# Patient Record
Sex: FEMALE | Race: WHITE | NOT HISPANIC OR LATINO | Employment: FULL TIME | URBAN - METROPOLITAN AREA
[De-identification: names, ages, dates, MRNs, and addresses within clinical notes are randomized per-mention and may not be internally consistent; named-entity substitution may affect disease eponyms.]

---

## 2017-01-11 ENCOUNTER — OFFICE VISIT - HEALTHEAST (OUTPATIENT)
Dept: FAMILY MEDICINE | Facility: CLINIC | Age: 37
End: 2017-01-11

## 2017-01-11 DIAGNOSIS — F41.1 GENERALIZED ANXIETY DISORDER: ICD-10-CM

## 2017-01-11 DIAGNOSIS — G47.00 INSOMNIA, UNSPECIFIED: ICD-10-CM

## 2017-01-11 DIAGNOSIS — G43.909 MIGRAINE: ICD-10-CM

## 2017-01-18 ENCOUNTER — COMMUNICATION - HEALTHEAST (OUTPATIENT)
Dept: FAMILY MEDICINE | Facility: CLINIC | Age: 37
End: 2017-01-18

## 2017-01-19 ENCOUNTER — COMMUNICATION - HEALTHEAST (OUTPATIENT)
Dept: FAMILY MEDICINE | Facility: CLINIC | Age: 37
End: 2017-01-19

## 2017-01-20 ENCOUNTER — COMMUNICATION - HEALTHEAST (OUTPATIENT)
Dept: FAMILY MEDICINE | Facility: CLINIC | Age: 37
End: 2017-01-20

## 2017-01-25 ENCOUNTER — COMMUNICATION - HEALTHEAST (OUTPATIENT)
Dept: FAMILY MEDICINE | Facility: CLINIC | Age: 37
End: 2017-01-25

## 2017-01-25 DIAGNOSIS — G43.909 MIGRAINE: ICD-10-CM

## 2017-02-07 ENCOUNTER — COMMUNICATION - HEALTHEAST (OUTPATIENT)
Dept: FAMILY MEDICINE | Facility: CLINIC | Age: 37
End: 2017-02-07

## 2017-02-07 DIAGNOSIS — G43.909 MIGRAINE: ICD-10-CM

## 2017-02-16 ENCOUNTER — COMMUNICATION - HEALTHEAST (OUTPATIENT)
Dept: FAMILY MEDICINE | Facility: CLINIC | Age: 37
End: 2017-02-16

## 2017-02-16 DIAGNOSIS — F41.1 GENERALIZED ANXIETY DISORDER: ICD-10-CM

## 2017-03-06 ENCOUNTER — COMMUNICATION - HEALTHEAST (OUTPATIENT)
Dept: FAMILY MEDICINE | Facility: CLINIC | Age: 37
End: 2017-03-06

## 2017-03-06 DIAGNOSIS — G43.909 MIGRAINE: ICD-10-CM

## 2017-03-15 ENCOUNTER — COMMUNICATION - HEALTHEAST (OUTPATIENT)
Dept: FAMILY MEDICINE | Facility: CLINIC | Age: 37
End: 2017-03-15

## 2017-03-15 DIAGNOSIS — G43.909 MIGRAINE: ICD-10-CM

## 2017-03-18 ENCOUNTER — COMMUNICATION - HEALTHEAST (OUTPATIENT)
Dept: FAMILY MEDICINE | Facility: CLINIC | Age: 37
End: 2017-03-18

## 2017-03-20 ENCOUNTER — COMMUNICATION - HEALTHEAST (OUTPATIENT)
Dept: FAMILY MEDICINE | Facility: CLINIC | Age: 37
End: 2017-03-20

## 2017-03-21 ENCOUNTER — OFFICE VISIT - HEALTHEAST (OUTPATIENT)
Dept: FAMILY MEDICINE | Facility: CLINIC | Age: 37
End: 2017-03-21

## 2017-03-21 DIAGNOSIS — F19.939: ICD-10-CM

## 2017-03-21 DIAGNOSIS — F41.1 GENERALIZED ANXIETY DISORDER: ICD-10-CM

## 2017-03-21 DIAGNOSIS — F11.10 OPIOID ABUSE (H): ICD-10-CM

## 2017-03-21 DIAGNOSIS — G43.909 MIGRAINE: ICD-10-CM

## 2017-03-21 ASSESSMENT — MIFFLIN-ST. JEOR: SCORE: 1329.26

## 2017-03-23 ENCOUNTER — COMMUNICATION - HEALTHEAST (OUTPATIENT)
Dept: SCHEDULING | Facility: CLINIC | Age: 37
End: 2017-03-23

## 2017-04-05 ENCOUNTER — COMMUNICATION - HEALTHEAST (OUTPATIENT)
Dept: FAMILY MEDICINE | Facility: CLINIC | Age: 37
End: 2017-04-05

## 2017-04-11 ENCOUNTER — COMMUNICATION - HEALTHEAST (OUTPATIENT)
Dept: FAMILY MEDICINE | Facility: CLINIC | Age: 37
End: 2017-04-11

## 2017-04-11 DIAGNOSIS — G47.00 INSOMNIA, UNSPECIFIED: ICD-10-CM

## 2017-04-12 ENCOUNTER — COMMUNICATION - HEALTHEAST (OUTPATIENT)
Dept: FAMILY MEDICINE | Facility: CLINIC | Age: 37
End: 2017-04-12

## 2017-04-12 DIAGNOSIS — G47.00 INSOMNIA, UNSPECIFIED: ICD-10-CM

## 2017-04-17 ENCOUNTER — COMMUNICATION - HEALTHEAST (OUTPATIENT)
Dept: FAMILY MEDICINE | Facility: CLINIC | Age: 37
End: 2017-04-17

## 2017-04-25 ENCOUNTER — OFFICE VISIT - HEALTHEAST (OUTPATIENT)
Dept: FAMILY MEDICINE | Facility: CLINIC | Age: 37
End: 2017-04-25

## 2017-04-25 DIAGNOSIS — F32.9 REACTIVE DEPRESSION: ICD-10-CM

## 2017-04-25 DIAGNOSIS — G43.909 MIGRAINE: ICD-10-CM

## 2017-04-25 DIAGNOSIS — F41.1 GENERALIZED ANXIETY DISORDER: ICD-10-CM

## 2017-04-25 DIAGNOSIS — F11.10 OPIOID ABUSE (H): ICD-10-CM

## 2017-04-30 ENCOUNTER — COMMUNICATION - HEALTHEAST (OUTPATIENT)
Dept: FAMILY MEDICINE | Facility: CLINIC | Age: 37
End: 2017-04-30

## 2017-05-01 ENCOUNTER — COMMUNICATION - HEALTHEAST (OUTPATIENT)
Dept: FAMILY MEDICINE | Facility: CLINIC | Age: 37
End: 2017-05-01

## 2017-05-01 DIAGNOSIS — G47.00 INSOMNIA, UNSPECIFIED: ICD-10-CM

## 2017-05-03 ENCOUNTER — COMMUNICATION - HEALTHEAST (OUTPATIENT)
Dept: FAMILY MEDICINE | Facility: CLINIC | Age: 37
End: 2017-05-03

## 2017-05-10 ENCOUNTER — OFFICE VISIT - HEALTHEAST (OUTPATIENT)
Dept: FAMILY MEDICINE | Facility: CLINIC | Age: 37
End: 2017-05-10

## 2017-05-10 DIAGNOSIS — F11.10 OPIOID ABUSE (H): ICD-10-CM

## 2017-05-10 DIAGNOSIS — F32.9 REACTIVE DEPRESSION: ICD-10-CM

## 2017-05-10 DIAGNOSIS — F41.1 GENERALIZED ANXIETY DISORDER: ICD-10-CM

## 2017-05-11 ENCOUNTER — COMMUNICATION - HEALTHEAST (OUTPATIENT)
Dept: FAMILY MEDICINE | Facility: CLINIC | Age: 37
End: 2017-05-11

## 2017-05-12 ENCOUNTER — COMMUNICATION - HEALTHEAST (OUTPATIENT)
Dept: FAMILY MEDICINE | Facility: CLINIC | Age: 37
End: 2017-05-12

## 2017-05-15 ENCOUNTER — COMMUNICATION - HEALTHEAST (OUTPATIENT)
Dept: FAMILY MEDICINE | Facility: CLINIC | Age: 37
End: 2017-05-15

## 2017-05-15 ENCOUNTER — AMBULATORY - HEALTHEAST (OUTPATIENT)
Dept: FAMILY MEDICINE | Facility: CLINIC | Age: 37
End: 2017-05-15

## 2017-05-15 DIAGNOSIS — G47.00 INSOMNIA, UNSPECIFIED: ICD-10-CM

## 2017-05-16 ENCOUNTER — COMMUNICATION - HEALTHEAST (OUTPATIENT)
Dept: FAMILY MEDICINE | Facility: CLINIC | Age: 37
End: 2017-05-16

## 2017-05-17 ENCOUNTER — COMMUNICATION - HEALTHEAST (OUTPATIENT)
Dept: FAMILY MEDICINE | Facility: CLINIC | Age: 37
End: 2017-05-17

## 2017-05-26 ENCOUNTER — COMMUNICATION - HEALTHEAST (OUTPATIENT)
Dept: FAMILY MEDICINE | Facility: CLINIC | Age: 37
End: 2017-05-26

## 2017-05-29 ENCOUNTER — COMMUNICATION - HEALTHEAST (OUTPATIENT)
Dept: FAMILY MEDICINE | Facility: CLINIC | Age: 37
End: 2017-05-29

## 2017-05-30 ENCOUNTER — COMMUNICATION - HEALTHEAST (OUTPATIENT)
Dept: FAMILY MEDICINE | Facility: CLINIC | Age: 37
End: 2017-05-30

## 2017-06-01 ENCOUNTER — OFFICE VISIT - HEALTHEAST (OUTPATIENT)
Dept: BEHAVIORAL HEALTH | Facility: CLINIC | Age: 37
End: 2017-06-01

## 2017-06-01 DIAGNOSIS — F41.1 GENERALIZED ANXIETY DISORDER: ICD-10-CM

## 2017-06-01 DIAGNOSIS — F11.10 OPIOID ABUSE (H): ICD-10-CM

## 2017-06-01 ASSESSMENT — MIFFLIN-ST. JEOR: SCORE: 1351.94

## 2017-06-12 ENCOUNTER — OFFICE VISIT - HEALTHEAST (OUTPATIENT)
Dept: FAMILY MEDICINE | Facility: CLINIC | Age: 37
End: 2017-06-12

## 2017-06-12 DIAGNOSIS — F41.1 GENERALIZED ANXIETY DISORDER: ICD-10-CM

## 2017-06-12 DIAGNOSIS — F11.10 OPIOID ABUSE (H): ICD-10-CM

## 2017-06-14 ENCOUNTER — COMMUNICATION - HEALTHEAST (OUTPATIENT)
Dept: FAMILY MEDICINE | Facility: CLINIC | Age: 37
End: 2017-06-14

## 2017-06-23 ENCOUNTER — COMMUNICATION - HEALTHEAST (OUTPATIENT)
Dept: FAMILY MEDICINE | Facility: CLINIC | Age: 37
End: 2017-06-23

## 2017-07-22 ENCOUNTER — COMMUNICATION - HEALTHEAST (OUTPATIENT)
Dept: SCHEDULING | Facility: CLINIC | Age: 37
End: 2017-07-22

## 2017-07-25 ENCOUNTER — COMMUNICATION - HEALTHEAST (OUTPATIENT)
Dept: FAMILY MEDICINE | Facility: CLINIC | Age: 37
End: 2017-07-25

## 2017-08-29 ENCOUNTER — AMBULATORY - HEALTHEAST (OUTPATIENT)
Dept: FAMILY MEDICINE | Facility: CLINIC | Age: 37
End: 2017-08-29

## 2017-10-30 ENCOUNTER — COMMUNICATION - HEALTHEAST (OUTPATIENT)
Dept: FAMILY MEDICINE | Facility: CLINIC | Age: 37
End: 2017-10-30

## 2019-06-04 ENCOUNTER — OFFICE VISIT - HEALTHEAST (OUTPATIENT)
Dept: FAMILY MEDICINE | Facility: CLINIC | Age: 39
End: 2019-06-04

## 2019-06-04 ENCOUNTER — COMMUNICATION - HEALTHEAST (OUTPATIENT)
Dept: SCHEDULING | Facility: CLINIC | Age: 39
End: 2019-06-04

## 2019-06-04 DIAGNOSIS — R10.13 ABDOMINAL PAIN, EPIGASTRIC: ICD-10-CM

## 2019-06-04 LAB
ERYTHROCYTE [DISTWIDTH] IN BLOOD BY AUTOMATED COUNT: 11 % (ref 11–14.5)
HCT VFR BLD AUTO: 37 % (ref 35–47)
HGB BLD-MCNC: 12.4 G/DL (ref 12–16)
MCH RBC QN AUTO: 31.7 PG (ref 27–34)
MCHC RBC AUTO-ENTMCNC: 33.7 G/DL (ref 32–36)
MCV RBC AUTO: 94 FL (ref 80–100)
PLATELET # BLD AUTO: 400 THOU/UL (ref 140–440)
PMV BLD AUTO: 7 FL (ref 7–10)
RBC # BLD AUTO: 3.93 MILL/UL (ref 3.8–5.4)
WBC: 6.8 THOU/UL (ref 4–11)

## 2019-06-04 RX ORDER — OMEPRAZOLE 40 MG/1
40 CAPSULE, DELAYED RELEASE ORAL DAILY
Qty: 90 CAPSULE | Refills: 3 | Status: SHIPPED | OUTPATIENT
Start: 2019-06-04

## 2019-06-04 RX ORDER — CLONAZEPAM 1 MG/1
TABLET ORAL
Refills: 2 | Status: SHIPPED | COMMUNITY
Start: 2019-05-29

## 2019-06-04 RX ORDER — LAMOTRIGINE 100 MG/1
50 TABLET ORAL
Refills: 1 | Status: SHIPPED | COMMUNITY
Start: 2019-04-20

## 2019-06-04 RX ORDER — CLONIDINE HYDROCHLORIDE 0.1 MG/1
TABLET ORAL
Refills: 1 | Status: SHIPPED | COMMUNITY
Start: 2019-04-02

## 2019-06-05 LAB
ALBUMIN SERPL-MCNC: 4.2 G/DL (ref 3.5–5)
ALP SERPL-CCNC: 14 U/L (ref 45–120)
ALT SERPL W P-5'-P-CCNC: 18 U/L (ref 0–45)
AST SERPL W P-5'-P-CCNC: 12 U/L (ref 0–40)
BILIRUB DIRECT SERPL-MCNC: 0.1 MG/DL
BILIRUB SERPL-MCNC: 0.3 MG/DL (ref 0–1)
LIPASE SERPL-CCNC: 37 U/L (ref 0–52)
PROT SERPL-MCNC: 6.8 G/DL (ref 6–8)

## 2019-06-20 ENCOUNTER — COMMUNICATION - HEALTHEAST (OUTPATIENT)
Dept: FAMILY MEDICINE | Facility: CLINIC | Age: 39
End: 2019-06-20

## 2019-11-07 ENCOUNTER — OFFICE VISIT - HEALTHEAST (OUTPATIENT)
Dept: FAMILY MEDICINE | Facility: CLINIC | Age: 39
End: 2019-11-07

## 2019-11-07 DIAGNOSIS — R11.0 NAUSEA: ICD-10-CM

## 2019-11-07 DIAGNOSIS — G47.00 INSOMNIA, UNSPECIFIED TYPE: ICD-10-CM

## 2019-11-07 DIAGNOSIS — F41.1 GENERALIZED ANXIETY DISORDER: ICD-10-CM

## 2019-11-07 DIAGNOSIS — R22.31 LUMP OF AXILLA, RIGHT: ICD-10-CM

## 2019-11-07 RX ORDER — ONDANSETRON 4 MG/1
4 TABLET, FILM COATED ORAL DAILY PRN
Qty: 30 TABLET | Refills: 1 | Status: SHIPPED | OUTPATIENT
Start: 2019-11-07

## 2019-11-07 ASSESSMENT — ANXIETY QUESTIONNAIRES
4. TROUBLE RELAXING: NEARLY EVERY DAY
6. BECOMING EASILY ANNOYED OR IRRITABLE: NEARLY EVERY DAY
GAD7 TOTAL SCORE: 21
3. WORRYING TOO MUCH ABOUT DIFFERENT THINGS: NEARLY EVERY DAY
7. FEELING AFRAID AS IF SOMETHING AWFUL MIGHT HAPPEN: NEARLY EVERY DAY
1. FEELING NERVOUS, ANXIOUS, OR ON EDGE: NEARLY EVERY DAY
2. NOT BEING ABLE TO STOP OR CONTROL WORRYING: NEARLY EVERY DAY
IF YOU CHECKED OFF ANY PROBLEMS ON THIS QUESTIONNAIRE, HOW DIFFICULT HAVE THESE PROBLEMS MADE IT FOR YOU TO DO YOUR WORK, TAKE CARE OF THINGS AT HOME, OR GET ALONG WITH OTHER PEOPLE: EXTREMELY DIFFICULT
5. BEING SO RESTLESS THAT IT IS HARD TO SIT STILL: NEARLY EVERY DAY

## 2019-11-07 ASSESSMENT — MIFFLIN-ST. JEOR: SCORE: 1188.65

## 2019-11-14 ENCOUNTER — HOSPITAL ENCOUNTER (OUTPATIENT)
Dept: MAMMOGRAPHY | Facility: CLINIC | Age: 39
Discharge: HOME | End: 2019-11-14
Attending: FAMILY MEDICINE

## 2019-11-14 DIAGNOSIS — R22.31 LUMP OF AXILLA, RIGHT: ICD-10-CM

## 2019-12-12 ENCOUNTER — COMMUNICATION - HEALTHEAST (OUTPATIENT)
Dept: FAMILY MEDICINE | Facility: CLINIC | Age: 39
End: 2019-12-12

## 2020-04-23 ENCOUNTER — COMMUNICATION - HEALTHEAST (OUTPATIENT)
Dept: FAMILY MEDICINE | Facility: CLINIC | Age: 40
End: 2020-04-23

## 2020-04-28 ENCOUNTER — COMMUNICATION - HEALTHEAST (OUTPATIENT)
Dept: FAMILY MEDICINE | Facility: CLINIC | Age: 40
End: 2020-04-28

## 2020-09-03 ENCOUNTER — COMMUNICATION - HEALTHEAST (OUTPATIENT)
Dept: FAMILY MEDICINE | Facility: CLINIC | Age: 40
End: 2020-09-03

## 2021-04-30 ENCOUNTER — AMBULATORY - HEALTHEAST (OUTPATIENT)
Dept: LAB | Facility: CLINIC | Age: 41
End: 2021-04-30

## 2021-04-30 ENCOUNTER — COMMUNICATION - HEALTHEAST (OUTPATIENT)
Dept: ADMINISTRATIVE | Facility: CLINIC | Age: 41
End: 2021-04-30

## 2021-04-30 DIAGNOSIS — Z79.899 CONTROLLED SUBSTANCE AGREEMENT SIGNED: ICD-10-CM

## 2021-04-30 LAB
AMPHETAMINE-CLINIC: ABNORMAL
BARBITURATES-CLINIC: ABNORMAL
BENZODIAZEPINES-CLINIC: ABNORMAL
BUPRENORPHINE-CLINIC: ABNORMAL
COCAINE-CLINIC: ABNORMAL
ECSTASY-CLINIC: ABNORMAL
MARIJUANA-CLINIC: ABNORMAL
METHADONE METABOLITE-CLINIC: ABNORMAL
METHAMPHETAMINE-CLINIC: ABNORMAL
OPIATES-CLINIC: ABNORMAL
OXIDANTS: NORMAL
OXYCODONE-CLINIC: ABNORMAL
PCP 25-CLINIC: ABNORMAL
PH-CLINIC: 4 (ref 5–8)
SP GR UR STRIP: 1.01 (ref 1–1.03)

## 2021-06-05 ENCOUNTER — RECORDS - HEALTHEAST (OUTPATIENT)
Dept: FAMILY MEDICINE | Facility: CLINIC | Age: 41
End: 2021-06-05

## 2021-06-05 DIAGNOSIS — R51.9 HEADACHE: ICD-10-CM

## 2021-06-24 ENCOUNTER — COMMUNICATION - HEALTHEAST (OUTPATIENT)
Dept: SCHEDULING | Facility: CLINIC | Age: 41
End: 2021-06-24

## 2021-06-26 ENCOUNTER — HEALTH MAINTENANCE LETTER (OUTPATIENT)
Age: 41
End: 2021-06-26

## 2021-07-15 VITALS
WEIGHT: 148 LBS | BODY MASS INDEX: 26.03 KG/M2 | BODY MASS INDEX: 25.52 KG/M2 | WEIGHT: 152 LBS | BODY MASS INDEX: 24.66 KG/M2 | WEIGHT: 154 LBS | WEIGHT: 151 LBS | HEIGHT: 65 IN | BODY MASS INDEX: 25.69 KG/M2

## 2021-07-15 VITALS — WEIGHT: 153 LBS | BODY MASS INDEX: 25.49 KG/M2 | BODY MASS INDEX: 26.03 KG/M2 | WEIGHT: 154 LBS | HEIGHT: 65 IN

## 2021-07-15 VITALS
HEART RATE: 78 BPM | BODY MASS INDEX: 19.49 KG/M2 | DIASTOLIC BLOOD PRESSURE: 100 MMHG | SYSTOLIC BLOOD PRESSURE: 150 MMHG | WEIGHT: 117 LBS | HEIGHT: 65 IN

## 2021-07-15 VITALS — BODY MASS INDEX: 21.29 KG/M2 | WEIGHT: 126 LBS
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
## 2021-07-15 NOTE — PROGRESS NOTES
Assessment:     1. Reactive depression     2. Opioid abuse  Ambulatory referral to Psychiatry   3. Generalized anxiety disorder  Ambulatory referral to Psychiatry       Plan:     Chantal is a very pleasant 37 yo female in today for a f/u regarding Wellbutrin, depression and anxiety. I recommended continuing the Wellbutrin but monitoring for ongoing sleep disturbance. We had a long discussion regarding her Ativan. I spoke last week to her therapist who stated that she needs to wean off the Ativan slowly and ultimately, not take this medication due to its addicting nature and that it does not help Chantal to learn to cope with her feelings in more constructive ways. I discussed with the patient that having the time and energy to devote to the hard work and recovery process is essential to her long term success and strongly urged her to be sure to limit her overtime as much as possible and offered to write a note if she needs a doctors order for that. I would write the note based on her diagnosis of EDIE. I tried to provide a timeline to gradually wean off the Ativan over the next 6 months, however, even her mom was very uncomfortable with that recommendation. I ultimately suggested an alternative which is that she would establish with a psychiatrist who would take over managing her medication and that after she establishes with someone within the next 3 months, then I would no longer prescribe that drug. She was OK with that plan and I placed a referral. F/U in 1 month. 30 minutes spend with patient today over 50% counseling about the above discussion.    Subjective:       36 y.o. female presents for evaluation of generalized anxiety disorder and opioid abuse. The patient continues at [facility] working with a therapist and remains sober from opioids. She was last seen about 3 weeks ago and states that the addition of Wellbutrin did seem to help with her depression and she is now having some days where she  "states she feels \"happy.\" She has had some insomnia from the new medication but last night slept well and hopes that is getting better as she would like to continue on the medication. She is still very stressed and anxious. She is going to be starting couples counseling soon. She also states that she feels exhausted trying to balance the demands of work, parenting, and her devoted time and work on her sobriety. This is the busiest time of the year for her work and she has been asked recently to work overtime. She also is worried about finances and has found out that her health insurance will only allow 10 more sessions with therapy. Her mom accompanies her again today and continues to live with them. The patient states that she is currently taking Ativan 3 times daily for anxiety. She really does not feel that she has coping skills to deal with her anxiety any other way. The thought of not having the medication \"terrifies her.\" Her mom also is worried about taking her off the Ativan right now as she feels it is needed to manage her high levels of anxiety.     The following portions of the patient's history were reviewed and updated as appropriate: allergies, current medications, past family history, past medical history, past social history, past surgical history and problem list.    Review of Systems  Pertinent items are noted in HPI.     History   Smoking Status     Former Smoker   Smokeless Tobacco     Never Used       Objective:      /72 (Patient Site: Left Arm, Patient Position: Sitting, Cuff Size: Adult Regular)  Pulse 72  Wt 151 lb (68.5 kg)  Breastfeeding? No  BMI 25.52 kg/m2  General appearance: alert, appears stated age and cooperative       This note has been dictated using voice recognition software. Any grammatical or context distortions are unintentional and inherent to the software  "

## 2021-07-15 NOTE — TELEPHONE ENCOUNTER
Reason for Call: Request for an order or referral: Pt is looking to get this done today!    Order or referral being requested: Pt is requesting a drug urine test to be done today if possible.     Date needed: as soon as possible    Has the patient been seen by the PCP for this problem? YES and Not Applicable    Additional comments: Pt stated that Dr. Mata would do this. Pt is going through a divorce and ex is stating she is on drugs and she needs this test to disprove the comments.     Phone number Patient can be reached at:  Home number on file [phone] (home)    Best Time:      Can we leave a detailed message on this number?  Yes    Call taken on 4/30/2021 at 2:05 PM by Shirin Hong

## 2021-07-15 NOTE — PROGRESS NOTES
Assessment:     1. Nausea  ondansetron (ZOFRAN) 4 MG tablet   2. Lump of axilla, right  US Breast Right Limited 1-3 Quadrants    Mammo Diagnostic Bilateral    CANCELED: Mammo Diagnostic Right   3. Generalized anxiety disorder     4. Insomnia, unspecified type         Plan:     Chantal is a 39-year-old female presenting today regarding nausea, lack of appetite and weight loss.  The patient has lost over 30 pounds over the past year.  I do think this is related to her severe stress and distress related to the divorce compounded by her underlying anxiety disorder.  I explained that since she has a primary psychiatrist, I do not feel comfortable addressing the symptoms outside of giving her some recommendations as well as prescribing some Zofran to take as needed for nausea.  However, we did discuss that medications such as Paxil and combination with Remeron at night could potentially be quite beneficial at this time to help her cope.  The patient has a completely benign physical exam and abdominal exam today and does not have any red flag symptoms to suggest an underlying GI disorder as a reason for her weight loss.  However, we did discuss that today and to monitor for any concerning signs or symptoms.  I encouraged her to call her psychiatrist for an appointment or consideration of an adjustment in her medication.  Patient's exam is relatively benign but I do think getting a diagnostic mammogram of her right breast and ultrasound along with a screening mammogram on her left would be appropriate and an order was placed today.    Subjective:       39 y.o. female presents today accompanied by her mom with concerns regarding significant nausea, occasional vomiting, insomnia and severe anxiety.  The patient has been in a very difficult process of divorce over the past few months and reports that the stress has continued to increase significantly especially in the past 3 to 4 months.  During that time, she has lost her  "appetite and has developed quite a bit of nausea and at times even feels weak because of this.  She has been trying to get down a protein smoothy each morning.  She states that the divorce has been \"ugly\" and very contentious and that the emotional pain of watching her daughter suffer through this has been extremely difficult for her.  The patient sees a psychiatrist and it sounds like also receives psychotherapy for an hour monthly from that same individual.  She has a history of longstanding and severe generalized anxiety disorder.  The patient is on clonazepam as well as Lamictal as prescribed by her psychiatrist.  She has not been sleeping at all recently and also is to talk about insomnia.  The patient was seen in June and diagnosed with a probable gastritis and empirically treated with omeprazole; the patient states that the medication and approach were definitely helpful and that her epigastric pain and nausea had significantly improved.  She really does not feel that her current nausea and lack of appetite are in any way related to that but rather related to severe distress and anxiety.  The patient's parents have moved in with her in order to help support her.  She is hoping to sign the final divorce decree in the near future and it is looking like she is going to be able to obtain 70% custody of her daughter which is important to her.  The patient when asked about any chemical use such as alcohol or opioids absolutely denies this and her mom is in support of that as well.  The patient has been very successful at maintaining her sobriety.  The patient also would like me to examine her right axilla.  She has a family history of her mom with breast cancer and she is concerned as she felt a little lump in her right axilla recently.      Reviewed: The following portions of the patient's history were reviewed and updated as appropriate: allergies, current medications, past family history, past medical history, " "past social history, past surgical history and problem list.    Review of Systems  Pertinent items are noted in HPI.        Objective:     BP (!) 150/100 (Patient Site: Left Arm, Patient Position: Sitting, Cuff Size: Adult Regular)   Pulse 78   Ht 5' 4.5\" (1.638 m)   Wt 117 lb (53.1 kg)   BMI 19.77 kg/m    General appearance: alert, appears stated age, cooperative and anxious  Lungs: clear to auscultation bilaterally  Heart: regular rate and rhythm, S1, S2 normal, no murmur, click, rub or gallop  Abdomen: soft, non-tender; bowel sounds normal; no masses,  no organomegaly  Breast/axilla: normal breast exam with a small, soft, mobile feeling lymph node right axilla posteriorly    This note has been dictated using voice recognition software. Any grammatical or context distortions are unintentional and inherent to the software  "

## 2021-07-15 NOTE — TELEPHONE ENCOUNTER
Test ordered. At one point, the patient was on clonazepam. Before running the test, we should clarify with the patient whether or not she will test positive for any substances (benzodiazepines, narcotics, stimulants, marijuana).

## 2021-07-15 NOTE — TELEPHONE ENCOUNTER
PT reports taking  lonidamine 1.0 mg daily  Clonidine 0.1 mg prn  Amitriptyline 0.5 mg  Clonazepam 1mg - 3mg daily

## 2021-07-15 NOTE — TELEPHONE ENCOUNTER
"RN Triage:   Patient calling in stating for the last month she has been having bad pain off and on in her upper rib cage in the middle. Patient under a lot of stress as she is . Family history of ulcers. Not able to come in for an office visit today. Patient is nauseated and only vomits if she gets \"worked up\".  Lost 20 lbs from   Patient sees a psychiatrist she sees. Patient declined an appointment for today or tomorrow.     Patient will call back to schedule when she knows her work schedule.   Sara Miller RN, BSN [facility] Triage Nurse        Reason for Disposition    MILD pain that comes and goes (cramps) lasts > 24 hours    Answer Assessment - Initial Assessment Questions  1. LOCATION: \"Where does it hurt?\"       Middle upper abdomen below rib cage  2. RADIATION: \"Does the pain shoot anywhere else?\" (e.g., chest, back)      No  3. ONSET: \"When did the pain begin?\" (e.g., minutes, hours or days ago)       4 weeks ago  4. SUDDEN: \"Gradual or sudden onset?\"       gradual  5. PATTERN \"Does the pain come and go, or is it constant?\"     - If constant: \"Is it getting better, staying the same, or worsening?\"       (Note: Constant means the pain never goes away completely; most serious pain is constant and it progresses)      - If intermittent: \"How long does it last?\" \"Do you have pain now?\"      (Note: Intermittent means the pain goes away completely between bouts)      Comes and goes  6. SEVERITY: \"How bad is the pain?\"    4-7/10      7. RECURRENT SYMPTOM: \"Have you ever had this type of abdominal pain before?\" If so, ask: \"When was the last time?\" and \"What happened that time?\" NO  8. AGGRAVATING FACTORS: \"Does anything seem to cause this pain?\" (e.g., foods, stress, alcohol)      Stress   9. CARDIAC SYMPTOMS: \"Do you have any of the following symptoms: chest pain, difficulty breathing, sweating, nausea?\"      Nausea  10. OTHER SYMPTOMS: \"Do you have any other symptoms?\" (e.g., fever, vomiting, " "diarrhea)        Vomiting with bad stress.   11. PREGNANCY: \"Is there any chance you are pregnant?\" \"When was your last menstrual period?\"        No chance, LMP was 5/15/19    Protocols used: ABDOMINAL PAIN - UPPER-A-OH      "

## 2021-07-15 NOTE — TELEPHONE ENCOUNTER
Left message to call back for: LM for patient to call back and schedule a physical for later this summer.  Information to relay to patient:  Please see above note and schedule upon recall
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
## 2021-07-15 NOTE — PROGRESS NOTES
Assessment:     1. Reactive depression     2. Opioid abuse     3. Generalized anxiety disorder     4. Migraine           Plan:     Chantal is a 36-year-old female going through a very challenging time in her life right now.  The patient is seeking the right support in his working hard with the right support to maintain long time sobriety from opioid abuse.  The patient currently is most struggling with symptoms of depression and I suggested adding Wellbutrin  mg daily.  The patient continues to struggle with significant anxiety as well at this time I do think continuing on Ativan is reasonable but with a firm understanding that weaning this down eventually is at goal.  I asked the patient to follow-up with me in 3 weeks regarding her response to the addition of the Wellbutrin.  I provided her with a two-week prescription of Ativan.    Subjective:       36 y.o. female presents for a follow-up evaluation regarding opioid abuse, chronic migraine headaches and generalized anxiety disorder.  The patient is currently in an intensive outpatient treatment program for her opioid dependency.  The patient has been sober now since mid March and reports that things are still very challenging.  The patient's mom and dad are living with her family to support her and her young daughter.  The patient does feel well supported at this time and is happy to be able to say that she is sober.  However, she definitely does not feel that this is easy.  The patient reports increased symptoms of low mood and depression although denies any suicidal ideations at all.  She has continued to be able to work full-time through this process.  The patient does continue to get migraine headaches that increased throughout the time of her menses.  The patient continues to struggle with significant anxiety symptoms which are long-standing for her.  She is currently on Lexapro 20 mg daily as well as Ativan which she takes up to 3 daily.  She is  currently smoking cigarettes and has a strong intention of getting off of those as well but does not feel that she would be capable of doing that at this time.    The following portions of the patient's history were reviewed and updated as appropriate: allergies, current medications, past family history, past medical history, past social history, past surgical history and problem list.    Review of Systems  Pertinent items are noted in HPI.     History   Smoking Status     Former Smoker   Smokeless Tobacco     Never Used       Objective:      /68 (Patient Site: Left Arm, Patient Position: Sitting, Cuff Size: Adult Regular)  Pulse 64  Wt 154 lb (69.9 kg)  Breastfeeding? No  BMI 26.03 kg/m2  General appearance: alert, appears stated age, cooperative and mildly tearful       This note has been dictated using voice recognition software. Any grammatical or context distortions are unintentional and inherent to the software

## 2021-07-15 NOTE — TELEPHONE ENCOUNTER
RN triage   Call from pt dad -- signed consent in chart   Pt injured L hand 5-6 days ago -- not clear on cause of injury --   Had iced it and wrapped it -- swelling and bruising   Did not seek medical care at that time   Now worse pain and swelling -- unwrapped hand   Very painful now   Some numbness fingers   Pt does not have insurance   Per protocol = should be seen ED - C -- check w/ PCP first     Please advise = when and where do you want pt seen ?  Joselyn Florentino RN [facility] RN triage  COVID 19 Nurse Triage Plan/Patient Instructions    Please be aware that novel coronavirus (COVID-19) may be circulating in the community. If you develop symptoms such as fever, cough, or SOB or if you have concerns about the presence of another infection including coronavirus (COVID-19), please contact your health care provider or visit  [email or web address].    Disposition/Instructions    ED Visit recommended. Follow protocol based instructions.      Bring Your Own Device:  Please also bring your smart device(s) (smart phones, tablets, laptops) and their charging cables for your personal use and to communicate with your care team during your visit.      Thank you for taking steps to prevent the spread of this virus.  o Limit your contact with others.  o Wear a simple mask to cover your cough.  o Wash your hands well and often.    Resources    [facility]: About COVID-19: [email or web address]    CDC: What to Do If You're Sick: www.cdc.gov/coronavirus/2019-ncov/about/steps-when-sick.html    CDC: Ending Home Isolation: www.cdc.gov/coronavirus/2019-ncov/hcp/disposition-in-home-patients.html     CDC: Caring for Someone: www.cdc.gov/coronavirus/2019-ncov/if-you-are-sick/care-for-someone.html     [facility]: Interim Guidance for Hospital Discharge to Home: www.health.[facility].[place]/diseases/coronavirus/hcp/hospdischarge.pdf    [facility] clinical trials (COVID-19 research studies):  [email or web address]-clinical-trials     Below are the COVID-19 hotlines at the [place] Department of [facility] ([facility]). Interpreters are available.   o For health questions: Call 509-014-4094 or 1-300.613.9179 (7 a.m. to 7 p.m.)  o For questions about schools and childcare: Call 574-647-4361 or 1-442.751.7741 (7 a.m. to 7 p.m.)       Reason for Disposition    Numbness (loss of sensation) of finger(s)    Additional Information    Negative: Major bleeding (actively dripping or spurting) that can't be stopped    Negative: Amputation or bone sticking through the skin    Negative: Serious injury with multiple fractures (broken bones)    Negative: Sounds like a life-threatening emergency to the triager    Protocols used: HAND AND WRIST INJURY-A-[place]

## 2021-07-15 NOTE — TELEPHONE ENCOUNTER
Radiology schedulers spoke with patient to schedule the ultra sound and she said it may no longer be needed, and she would call back if she wants to schedule.

## 2021-07-15 NOTE — LETTER
Letter by Yasemin Mata MD at      Author: Yasemin Mata MD Service: -- Author Type: --    Filed:  Encounter Date: 4/28/2020 Status: (Other)           Chantal Taylor  [address] [place] [address]      April 28, 2020      Dear Chantal    In reviewing your records, we have determined a gap in your preventive services. Based on your age and health history, we recommend the follow service.     ? General Physical after July      If you have had the service elsewhere, please contact us so we can update our records. Please let us know if you have transferred your care to another clinic.    Please call [phone] to schedule this appointment.    We believe that a strong preventive care program, including regular physicals and follow-up care is an important part of a healthy lifestyle and we are committed to helping you maintain your health.    Thank you for choosing us as your health care provider.    Sincerely,     [facility]

## 2021-07-15 NOTE — PROGRESS NOTES
Assessment:     1. Abdominal pain, epigastric  Lipase    Hepatic Profile    HM2(CBC w/o Differential)    US Abdomen Complete    omeprazole (PRILOSEC) 40 MG capsule    ondansetron (ZOFRAN) 4 MG tablet    sucralfate (CARAFATE) 1 gram tablet       Plan:     Chantal is a 38-year-old female presenting today with epigastric pain and nausea.  I think she most likely has gastritis although a gastric ulcer is a possibility as well.  I prescribed omeprazole 40 mill grams once daily as well as Carafate 1 g to take 4 times daily.  I ordered some labs today in addition to an abdominal ultrasound.  I expressed to the patient that she should start feeling better within a week and certainly much better within 2 weeks.  If this is not the case I asked her to give me a call.  The patient is well supported by family and friends currently and has been able to continue to function both as a mom as well as working despite the stress.    Subjective:       38 y.o. female presents for evaluation of epigastric pain and nausea. These symptoms have been ongoing for the past couple months. The symptoms are moderately severe and she has lost about 20 pounds.  The symptoms started right about the time that she physically moved out of her home as she and her  are going through very stressful divorce.  She admits to being very stressed and not sleeping well at night.  Her appetite is definitely reduced.      Reviewed: The following portions of the patient's history were reviewed and updated as appropriate: allergies, current medications, past family history, past medical history, past social history, past surgical history and problem list.    Review of Systems  Pertinent items are noted in HPI.        Objective:     /72 (Patient Site: Left Arm, Patient Position: Sitting, Cuff Size: Adult Regular)   Pulse 76   Wt 126 lb (57.2 kg)   BMI 21.29 kg/m    General appearance: alert, appears stated age and cooperative  Abdomen: moderately  tender to palpation in the epigastrium      This note has been dictated using voice recognition software. Any grammatical or context distortions are unintentional and inherent to the software

## 2021-07-16 ASSESSMENT — ANXIETY QUESTIONNAIRES: GAD7 TOTAL SCORE: 21

## 2021-07-20 PROBLEM — F32.9 REACTIVE DEPRESSION: Status: ACTIVE | Noted: 2017-05-11

## 2021-08-03 PROBLEM — F11.10 OPIOID ABUSE (H): Status: RESOLVED | Noted: 2017-03-23 | Resolved: 2019-11-08

## 2021-10-16 ENCOUNTER — HEALTH MAINTENANCE LETTER (OUTPATIENT)
Age: 41
End: 2021-10-16

## 2022-07-23 ENCOUNTER — HEALTH MAINTENANCE LETTER (OUTPATIENT)
Age: 42
End: 2022-07-23

## 2022-10-01 ENCOUNTER — HEALTH MAINTENANCE LETTER (OUTPATIENT)
Age: 42
End: 2022-10-01

## 2023-08-06 ENCOUNTER — HEALTH MAINTENANCE LETTER (OUTPATIENT)
Age: 43
End: 2023-08-06

## 2024-03-03 ENCOUNTER — HEALTH MAINTENANCE LETTER (OUTPATIENT)
Age: 44
End: 2024-03-03